# Patient Record
Sex: FEMALE | Race: WHITE | NOT HISPANIC OR LATINO | URBAN - METROPOLITAN AREA
[De-identification: names, ages, dates, MRNs, and addresses within clinical notes are randomized per-mention and may not be internally consistent; named-entity substitution may affect disease eponyms.]

---

## 2018-05-05 ENCOUNTER — EMERGENCY (EMERGENCY)
Facility: HOSPITAL | Age: 73
LOS: 1 days | End: 2018-05-05
Attending: EMERGENCY MEDICINE
Payer: COMMERCIAL

## 2018-05-05 VITALS
SYSTOLIC BLOOD PRESSURE: 121 MMHG | RESPIRATION RATE: 16 BRPM | OXYGEN SATURATION: 97 % | DIASTOLIC BLOOD PRESSURE: 62 MMHG | HEART RATE: 68 BPM | TEMPERATURE: 98 F

## 2018-05-05 VITALS
DIASTOLIC BLOOD PRESSURE: 69 MMHG | TEMPERATURE: 98 F | HEIGHT: 70 IN | SYSTOLIC BLOOD PRESSURE: 110 MMHG | HEART RATE: 75 BPM | WEIGHT: 190.04 LBS | RESPIRATION RATE: 18 BRPM | OXYGEN SATURATION: 96 %

## 2018-05-05 DIAGNOSIS — E23.6 OTHER DISORDERS OF PITUITARY GLAND: Chronic | ICD-10-CM

## 2018-05-05 PROCEDURE — 99284 EMERGENCY DEPT VISIT MOD MDM: CPT

## 2018-05-05 PROCEDURE — 93971 EXTREMITY STUDY: CPT | Mod: 26,RT

## 2018-05-05 PROCEDURE — 73562 X-RAY EXAM OF KNEE 3: CPT | Mod: 26,RT

## 2018-05-05 PROCEDURE — 99284 EMERGENCY DEPT VISIT MOD MDM: CPT | Mod: 25

## 2018-05-05 PROCEDURE — 73562 X-RAY EXAM OF KNEE 3: CPT

## 2018-05-05 PROCEDURE — 93971 EXTREMITY STUDY: CPT

## 2018-05-05 RX ORDER — ASPIRIN/CALCIUM CARB/MAGNESIUM 324 MG
1 TABLET ORAL
Qty: 0 | Refills: 0 | COMMUNITY

## 2018-05-05 RX ORDER — METOPROLOL TARTRATE 50 MG
1 TABLET ORAL
Qty: 0 | Refills: 0 | COMMUNITY

## 2018-05-05 NOTE — ED PROVIDER NOTE - OBJECTIVE STATEMENT
73 yo white female with one week of aching right knee pain, posterior aspect worse that increased in pain today after walking. Did not fall. No direct trauma. No fever or chills. No back pain. Pain increased with weight bearing, better with rest.

## 2018-05-05 NOTE — ED ADULT NURSE NOTE - CHIEF COMPLAINT QUOTE
right knee pain since last friday, Seen by Ortho and was told it was arthritis but patient having worsening pain. Denies trauma/falling

## 2018-05-05 NOTE — ED ADULT NURSE NOTE - OBJECTIVE STATEMENT
Pt. presenting to the ED complaining of right knee pain. Pt. states that last wednesday she felt something twist in her knee and it has hurt her to move the extremity since. She was walking up an incline. No trauma to the knee. Was seen by an orthopedist and he told her it was arthritis. No numbness or tingling. Positive pedal pulses in the affected extremity.

## 2018-05-05 NOTE — ED PROVIDER NOTE - MUSCULOSKELETAL, MLM
Mild tenderness to palpation right knee, posteriormedial aspect with no ligament laxity, effusion or palpable cords.

## 2021-03-02 NOTE — ED ADULT NURSE NOTE - NS ED NURSE RECORD ANOTHER HT AND WT
no dizziness/no fever/no nausea/no numbness/no pain/no tingling/no vomiting/no weakness/no chills/no decreased eating/drinking
Yes

## 2021-04-27 PROBLEM — I10 ESSENTIAL (PRIMARY) HYPERTENSION: Chronic | Status: ACTIVE | Noted: 2018-05-05

## 2021-04-28 ENCOUNTER — APPOINTMENT (OUTPATIENT)
Dept: OTOLARYNGOLOGY | Facility: CLINIC | Age: 76
End: 2021-04-28
Payer: MEDICARE

## 2021-04-28 VITALS
WEIGHT: 197 LBS | HEIGHT: 70 IN | HEART RATE: 86 BPM | BODY MASS INDEX: 28.2 KG/M2 | SYSTOLIC BLOOD PRESSURE: 127 MMHG | TEMPERATURE: 97.2 F | DIASTOLIC BLOOD PRESSURE: 89 MMHG

## 2021-04-28 DIAGNOSIS — H91.10 PRESBYCUSIS, UNSPECIFIED EAR: ICD-10-CM

## 2021-04-28 PROCEDURE — 92567 TYMPANOMETRY: CPT

## 2021-04-28 PROCEDURE — 92557 COMPREHENSIVE HEARING TEST: CPT

## 2021-04-28 PROCEDURE — G0268 REMOVAL OF IMPACTED WAX MD: CPT

## 2021-04-28 PROCEDURE — 99203 OFFICE O/P NEW LOW 30 MIN: CPT | Mod: 25

## 2021-04-28 NOTE — PHYSICAL EXAM
[Normal] : no abnormal secretions [de-identified] : MEERA IMPACTED CERUMEN REMOVED/ HEARING IMPROVED [de-identified] : SLIGHTLY BLOOD STAINED/ STARTED XERALTA RECENTLY FOR HER VALVE SURGERY

## 2021-04-28 NOTE — REVIEW OF SYSTEMS
[Sneezing] : sneezing [Seasonal Allergies] : seasonal allergies [Post Nasal Drip] : post nasal drip [Ear Pain] : ear pain [Ear Itch] : ear itch [Nose Bleeds] : nose bleeds [Throat Dryness] : throat dryness [Throat Itching] : throat itching [Patient Intake Form Reviewed] : Patient intake form was reviewed

## 2021-05-10 NOTE — ED ADULT TRIAGE NOTE - WEIGHT IN KG
5/13/2021    Patient: Marilyn Gabriel   YOB: 1955   Date of Visit: 5/10/2021     Jia Cortez MD  [address] [facility]  [address]  [address] [facility]  Via Fax: [phone]    Dear Jia Cortez MD,      Thank you for referring Ms. Alise Jones to [address] MAIN OFF[address] [place] [address] for evaluation. My notes for this consultation are attached. If you have questions, please do not hesitate to call me. I look forward to following your patient along with you.       Sincerely,    Tio Aguiar MD 86.2

## 2021-07-27 ENCOUNTER — APPOINTMENT (OUTPATIENT)
Dept: NEUROLOGY | Facility: CLINIC | Age: 76
End: 2021-07-27

## 2024-04-25 ENCOUNTER — NON-APPOINTMENT (OUTPATIENT)
Age: 79
End: 2024-04-25

## 2024-04-26 ENCOUNTER — APPOINTMENT (OUTPATIENT)
Dept: OTOLARYNGOLOGY | Facility: CLINIC | Age: 79
End: 2024-04-26
Payer: MEDICARE

## 2024-04-26 VITALS
BODY MASS INDEX: 28.35 KG/M2 | DIASTOLIC BLOOD PRESSURE: 80 MMHG | WEIGHT: 198 LBS | HEIGHT: 70 IN | SYSTOLIC BLOOD PRESSURE: 134 MMHG | HEART RATE: 76 BPM

## 2024-04-26 DIAGNOSIS — H93.8X3 OTHER SPECIFIED DISORDERS OF EAR, BILATERAL: ICD-10-CM

## 2024-04-26 DIAGNOSIS — H61.20 IMPACTED CERUMEN, UNSPECIFIED EAR: ICD-10-CM

## 2024-04-26 DIAGNOSIS — H60.60 UNSPECIFIED CHRONIC OTITIS EXTERNA, UNSPECIFIED EAR: ICD-10-CM

## 2024-04-26 DIAGNOSIS — H93.A9 PULSATILE TINNITUS, UNSPECIFIED EAR: ICD-10-CM

## 2024-04-26 PROCEDURE — 92567 TYMPANOMETRY: CPT

## 2024-04-26 PROCEDURE — G0268 REMOVAL OF IMPACTED WAX MD: CPT

## 2024-04-26 PROCEDURE — 92557 COMPREHENSIVE HEARING TEST: CPT

## 2024-04-26 PROCEDURE — 99213 OFFICE O/P EST LOW 20 MIN: CPT | Mod: 25

## 2024-04-26 NOTE — HISTORY OF PRESENT ILLNESS
[de-identified] :  Patient presents for pulsatile tinnitus and clogged ear sensation, decreased hearing both ears for the last couple weeks. PT denies otalgia, otorrhea. He denies any recent vertigo. He denies fevers, chills. He denies history of recurrent ear infections.

## 2025-06-06 ENCOUNTER — APPOINTMENT (OUTPATIENT)
Dept: OTOLARYNGOLOGY | Facility: CLINIC | Age: 80
End: 2025-06-06

## 2025-06-06 ENCOUNTER — NON-APPOINTMENT (OUTPATIENT)
Age: 80
End: 2025-06-06

## 2025-06-06 VITALS
BODY MASS INDEX: 28.63 KG/M2 | SYSTOLIC BLOOD PRESSURE: 119 MMHG | WEIGHT: 200 LBS | HEIGHT: 70 IN | HEART RATE: 73 BPM | DIASTOLIC BLOOD PRESSURE: 81 MMHG

## 2025-06-06 PROCEDURE — 69210 REMOVE IMPACTED EAR WAX UNI: CPT
